# Patient Record
Sex: MALE | Race: OTHER | HISPANIC OR LATINO | Employment: FULL TIME | URBAN - METROPOLITAN AREA
[De-identification: names, ages, dates, MRNs, and addresses within clinical notes are randomized per-mention and may not be internally consistent; named-entity substitution may affect disease eponyms.]

---

## 2018-02-08 ENCOUNTER — APPOINTMENT (OUTPATIENT)
Dept: CT IMAGING | Facility: HOSPITAL | Age: 23
End: 2018-02-08

## 2018-02-08 ENCOUNTER — HOSPITAL ENCOUNTER (EMERGENCY)
Facility: HOSPITAL | Age: 23
Discharge: HOME | End: 2018-02-08
Attending: EMERGENCY MEDICINE | Admitting: EMERGENCY MEDICINE

## 2018-02-08 VITALS
SYSTOLIC BLOOD PRESSURE: 99 MMHG | TEMPERATURE: 98 F | HEIGHT: 69 IN | DIASTOLIC BLOOD PRESSURE: 45 MMHG | WEIGHT: 215 LBS | HEART RATE: 66 BPM | BODY MASS INDEX: 31.84 KG/M2 | OXYGEN SATURATION: 99 % | RESPIRATION RATE: 16 BRPM

## 2018-02-08 DIAGNOSIS — N20.0 KIDNEY STONE: Primary | ICD-10-CM

## 2018-02-08 LAB
ALBUMIN SERPL-MCNC: 4.4 G/DL (ref 3.5–5.2)
ALBUMIN/GLOB SERPL: 1.5 G/DL
ALP SERPL-CCNC: 96 U/L (ref 39–117)
ALT SERPL W P-5'-P-CCNC: 28 U/L (ref 1–41)
ANION GAP SERPL CALCULATED.3IONS-SCNC: 14.1 MMOL/L
AST SERPL-CCNC: 17 U/L (ref 1–40)
BACTERIA UR QL AUTO: ABNORMAL /HPF
BASOPHILS # BLD AUTO: 0.03 10*3/MM3 (ref 0–0.2)
BASOPHILS NFR BLD AUTO: 0.3 % (ref 0–1.5)
BILIRUB SERPL-MCNC: 0.4 MG/DL (ref 0.1–1.2)
BILIRUB UR QL STRIP: NEGATIVE
BUN BLD-MCNC: 12 MG/DL (ref 6–20)
BUN/CREAT SERPL: 13.2 (ref 7–25)
CALCIUM SPEC-SCNC: 9.5 MG/DL (ref 8.6–10.5)
CHLORIDE SERPL-SCNC: 105 MMOL/L (ref 98–107)
CLARITY UR: CLEAR
CO2 SERPL-SCNC: 22.9 MMOL/L (ref 22–29)
COLOR UR: YELLOW
CREAT BLD-MCNC: 0.91 MG/DL (ref 0.76–1.27)
DEPRECATED RDW RBC AUTO: 41.9 FL (ref 37–54)
EOSINOPHIL # BLD AUTO: 0.09 10*3/MM3 (ref 0–0.7)
EOSINOPHIL NFR BLD AUTO: 0.8 % (ref 0.3–6.2)
ERYTHROCYTE [DISTWIDTH] IN BLOOD BY AUTOMATED COUNT: 13.7 % (ref 11.5–14.5)
GFR SERPL CREATININE-BSD FRML MDRD: 126 ML/MIN/1.73
GLOBULIN UR ELPH-MCNC: 2.9 GM/DL
GLUCOSE BLD-MCNC: 121 MG/DL (ref 65–99)
GLUCOSE UR STRIP-MCNC: NEGATIVE MG/DL
HCT VFR BLD AUTO: 43.9 % (ref 40.4–52.2)
HGB BLD-MCNC: 16 G/DL (ref 13.7–17.6)
HGB UR QL STRIP.AUTO: ABNORMAL
HYALINE CASTS UR QL AUTO: ABNORMAL /LPF
IMM GRANULOCYTES # BLD: 0.02 10*3/MM3 (ref 0–0.03)
IMM GRANULOCYTES NFR BLD: 0.2 % (ref 0–0.5)
KETONES UR QL STRIP: NEGATIVE
LEUKOCYTE ESTERASE UR QL STRIP.AUTO: NEGATIVE
LIPASE SERPL-CCNC: 26 U/L (ref 13–60)
LYMPHOCYTES # BLD AUTO: 1.16 10*3/MM3 (ref 0.9–4.8)
LYMPHOCYTES NFR BLD AUTO: 10 % (ref 19.6–45.3)
MCH RBC QN AUTO: 31 PG (ref 27–32.7)
MCHC RBC AUTO-ENTMCNC: 36.4 G/DL (ref 32.6–36.4)
MCV RBC AUTO: 85.1 FL (ref 79.8–96.2)
MONOCYTES # BLD AUTO: 0.41 10*3/MM3 (ref 0.2–1.2)
MONOCYTES NFR BLD AUTO: 3.5 % (ref 5–12)
NEUTROPHILS # BLD AUTO: 9.87 10*3/MM3 (ref 1.9–8.1)
NEUTROPHILS NFR BLD AUTO: 85.2 % (ref 42.7–76)
NITRITE UR QL STRIP: NEGATIVE
NRBC BLD MANUAL-RTO: 0 /100 WBC (ref 0–0)
PH UR STRIP.AUTO: 6.5 [PH] (ref 5–8)
PLATELET # BLD AUTO: 195 10*3/MM3 (ref 140–500)
PMV BLD AUTO: 12.1 FL (ref 6–12)
POTASSIUM BLD-SCNC: 4.2 MMOL/L (ref 3.5–5.2)
PROT SERPL-MCNC: 7.3 G/DL (ref 6–8.5)
PROT UR QL STRIP: NEGATIVE
RBC # BLD AUTO: 5.16 10*6/MM3 (ref 4.6–6)
RBC # UR: ABNORMAL /HPF
REF LAB TEST METHOD: ABNORMAL
SODIUM BLD-SCNC: 142 MMOL/L (ref 136–145)
SP GR UR STRIP: 1.01 (ref 1–1.03)
SQUAMOUS #/AREA URNS HPF: ABNORMAL /HPF
UROBILINOGEN UR QL STRIP: ABNORMAL
WBC NRBC COR # BLD: 11.58 10*3/MM3 (ref 4.5–10.7)
WBC UR QL AUTO: ABNORMAL /HPF

## 2018-02-08 PROCEDURE — 83690 ASSAY OF LIPASE: CPT | Performed by: EMERGENCY MEDICINE

## 2018-02-08 PROCEDURE — 96374 THER/PROPH/DIAG INJ IV PUSH: CPT

## 2018-02-08 PROCEDURE — 80053 COMPREHEN METABOLIC PANEL: CPT | Performed by: EMERGENCY MEDICINE

## 2018-02-08 PROCEDURE — 25010000002 KETOROLAC TROMETHAMINE PER 15 MG: Performed by: EMERGENCY MEDICINE

## 2018-02-08 PROCEDURE — 99284 EMERGENCY DEPT VISIT MOD MDM: CPT

## 2018-02-08 PROCEDURE — 81001 URINALYSIS AUTO W/SCOPE: CPT | Performed by: EMERGENCY MEDICINE

## 2018-02-08 PROCEDURE — 25010000002 ONDANSETRON PER 1 MG: Performed by: EMERGENCY MEDICINE

## 2018-02-08 PROCEDURE — 96375 TX/PRO/DX INJ NEW DRUG ADDON: CPT

## 2018-02-08 PROCEDURE — 85025 COMPLETE CBC W/AUTO DIFF WBC: CPT | Performed by: EMERGENCY MEDICINE

## 2018-02-08 PROCEDURE — 74176 CT ABD & PELVIS W/O CONTRAST: CPT

## 2018-02-08 PROCEDURE — 96361 HYDRATE IV INFUSION ADD-ON: CPT

## 2018-02-08 RX ORDER — KETOROLAC TROMETHAMINE 30 MG/ML
30 INJECTION, SOLUTION INTRAMUSCULAR; INTRAVENOUS ONCE
Status: COMPLETED | OUTPATIENT
Start: 2018-02-08 | End: 2018-02-08

## 2018-02-08 RX ORDER — ONDANSETRON 2 MG/ML
4 INJECTION INTRAMUSCULAR; INTRAVENOUS ONCE
Status: COMPLETED | OUTPATIENT
Start: 2018-02-08 | End: 2018-02-08

## 2018-02-08 RX ADMIN — KETOROLAC TROMETHAMINE 30 MG: 30 INJECTION, SOLUTION INTRAMUSCULAR at 10:28

## 2018-02-08 RX ADMIN — HYDROMORPHONE HYDROCHLORIDE 1 MG: 10 INJECTION INTRAMUSCULAR; INTRAVENOUS; SUBCUTANEOUS at 10:33

## 2018-02-08 RX ADMIN — SODIUM CHLORIDE 1000 ML: 9 INJECTION, SOLUTION INTRAVENOUS at 10:18

## 2018-02-08 RX ADMIN — ONDANSETRON 4 MG: 2 INJECTION INTRAMUSCULAR; INTRAVENOUS at 10:25

## 2018-02-08 NOTE — DISCHARGE INSTRUCTIONS
Kidney Stones  Kidney stones (urolithiasis) are solid, rock-like deposits that form inside of the organs that make urine (kidneys). A kidney stone may form in a kidney and move into the bladder, where it can cause intense pain and block the flow of urine. Kidney stones are created when high levels of certain minerals are found in the urine. They are usually passed through urination, but in some cases, medical treatment may be needed to remove them.  What are the causes?  Kidney stones may be caused by:  · A condition in which certain glands produce too much parathyroid hormone (primary hyperparathyroidism), which causes too much calcium buildup in the blood.  · Buildup of uric acid crystals in the bladder (hyperuricosuria). Uric acid is a chemical that the body produces when you eat certain foods. It usually exits the body in the urine.  · Narrowing (stricture) of one or both of the tubes that drain urine from the kidneys to the bladder (ureters).  · A kidney blockage that is present at birth (congenital obstruction).  · Past surgery on the kidney or the ureters, such as gastric bypass surgery.  What increases the risk?  The following factors make you more likely to develop kidney stones:  · Having had a kidney stone in the past.  · Having a family history of kidney stones.  · Not drinking enough water.  · Eating a diet that is high in protein, salt (sodium), or sugar.  · Being overweight or obese.  What are the signs or symptoms?  Symptoms of a kidney stone may include:  · Nausea.  · Vomiting.  · Blood in the urine (hematuria).  · Pain in the side of the abdomen, right below the ribs (flank pain). Pain usually spreads (radiates) to the groin.  · Needing to urinate frequently or urgently.  How is this diagnosed?  This condition may be diagnosed based on:  · Your medical history.  · A physical exam.  · Blood tests.  · Urine tests.  · CT scan.  · Abdominal X-ray.  · A procedure to examine the inside of the bladder  (cystoscopy).  How is this treated?  Treatment for kidney stones depends on the size, location, and makeup of the stones. Treatment may involve:  · Analyzing your urine before and after you pass the stone through urination.  · Being monitored at the hospital until you pass the stone through urination.  · Increasing your fluid intake and decreasing the amount of calcium and protein in your diet.  · A procedure to break up kidney stones in the bladder using:  ¨ A focused beam of light (laser therapy).  ¨ Shock waves (extracorporeal shock wave lithotripsy).  · Surgery to remove kidney stones. This may be needed if you have severe pain or have stones that block your urinary tract.  Follow these instructions at home:  Eating and drinking  · Drink enough fluid to keep your urine clear or pale yellow. This will help you to pass the kidney stone.  · If directed, change your diet. This may include:  ¨ Limiting how much sodium you eat.  ¨ Eating more fruits and vegetables.  ¨ Limiting how much meat, poultry, fish, and eggs you eat.  · Follow instructions from your health care provider about eating or drinking restrictions.  General instructions  · Collect urine samples as told by your health care provider. You may need to collect a urine sample:  ¨ 24 hours after you pass the stone.  ¨ 8-12 weeks after passing the kidney stone, and every 6-12 months after that.  · Strain your urine every time you urinate, for as long as directed. Use the strainer that your health care provider recommends.  · Do not throw out the kidney stone after passing it. Keep the stone so it can be tested by your health care provider. Testing the makeup of your kidney stone may help prevent you from getting kidney stones in the future.  · Take over-the-counter and prescription medicines only as told by your health care provider.  · Keep all follow-up visits as told by your health care provider. This is important. You may need follow-up X-rays or  ultrasounds to make sure that your stone has passed.  How is this prevented?  To prevent another kidney stone:  · Drink enough fluid to keep your urine clear or pale yellow. This is the best way to prevent kidney stones.  · Eat a healthy diet and follow recommendations from your health care provider about foods to avoid. You may be instructed to eat a low-protein diet. Recommendations vary depending on the type of kidney stone that you have.  · Maintain a healthy weight.  Contact a health care provider if:  · You have pain that gets worse or does not get better with medicine.  Get help right away if:  · You have a fever or chills.  · You develop severe pain.  · You develop new abdominal pain.  · You faint.  · You are unable to urinate.  This information is not intended to replace advice given to you by your health care provider. Make sure you discuss any questions you have with your health care provider.  Document Released: 12/18/2006 Document Revised: 07/07/2017 Document Reviewed: 06/02/2017  [employer] Interactive Patient Education © 2017 [employer]

## 2018-02-08 NOTE — ED PROVIDER NOTES
EMERGENCY DEPARTMENT ENCOUNTER    CHIEF COMPLAINT  Chief Complaint: Flank pain  History given by: Pt  History limited by: Nothing  Room Number: 37/37  PMD: No Known Provider      HPI:  Pt is a 22 y.o. male who presents complaining of sudden onset R flank pain onset this morning as he was driving to work this morning. Pt reports his pain radiates into his R abd and groin. Pt also c/o N/V. Wife reports that the pt had generalized body aches last week, and that their family was ill with the flu at around that time. Pt denies a prior hx of kidney stones.    Duration:  This AM  Onset: sudden  Timing: constant  Location: R flank  Radiation: into R abd and groin  Quality: pain  Intensity/Severity: severe  Progression: unchanged  Associated Symptoms: N/V, abd pain  Aggravating Factors: none  Alleviating Factors: none  Previous Episodes: Pt denies a prior hx of kidney stones  Treatment before arrival: None stated PTA    PAST MEDICAL HISTORY  Active Ambulatory Problems     Diagnosis Date Noted   • No Active Ambulatory Problems     Resolved Ambulatory Problems     Diagnosis Date Noted   • No Resolved Ambulatory Problems     No Additional Past Medical History       PAST SURGICAL HISTORY  History reviewed. No pertinent surgical history.    FAMILY HISTORY  History reviewed. No pertinent family history.    SOCIAL HISTORY  Social History     Social History   • Marital status:      Spouse name: N/A   • Number of children: N/A   • Years of education: N/A     Occupational History   • Not on file.     Social History Main Topics   • Smoking status: Never Smoker   • Smokeless tobacco: Not on file   • Alcohol use 0.6 oz/week     1 Cans of beer per week   • Drug use: No   • Sexual activity: Defer     Other Topics Concern   • Not on file     Social History Narrative   • No narrative on file       ALLERGIES  Review of patient's allergies indicates no known allergies.    REVIEW OF SYSTEMS  Review of Systems   Constitutional: Negative  for activity change, appetite change and fever.   HENT: Negative for congestion and sore throat.    Eyes: Negative.    Respiratory: Negative for cough and shortness of breath.    Cardiovascular: Negative for chest pain and leg swelling.   Gastrointestinal: Positive for abdominal pain (R sided), nausea and vomiting. Negative for diarrhea.   Endocrine: Negative.    Genitourinary: Positive for flank pain (R). Negative for decreased urine volume and dysuria.   Musculoskeletal: Negative for neck pain.   Skin: Negative for rash and wound.   Allergic/Immunologic: Negative.    Neurological: Negative for weakness, numbness and headaches.   Hematological: Negative.    Psychiatric/Behavioral: Negative.    All other systems reviewed and are negative.      PHYSICAL EXAM  ED Triage Vitals   Temp Pulse Resp BP SpO2   -- -- -- -- --             Temp src Heart Rate Source Patient Position BP Location FiO2 (%)   -- -- -- -- --              Physical Exam   Constitutional: He is oriented to person, place, and time. He appears distressed (Pt appears to be in pain).   HENT:   Head: Normocephalic and atraumatic.   Eyes: EOM are normal. Pupils are equal, round, and reactive to light.   Neck: Normal range of motion. Neck supple.   Cardiovascular: Normal rate, regular rhythm and normal heart sounds.    Pulmonary/Chest: Effort normal and breath sounds normal. No respiratory distress.   Abdominal: Soft. Bowel sounds are hypoactive. There is tenderness in the right lower quadrant. There is CVA tenderness (R). There is no rebound and no guarding.   Musculoskeletal: Normal range of motion. He exhibits no edema.   Neurological: He is alert and oriented to person, place, and time. He has normal sensation and normal strength.   Skin: Skin is warm and dry.   Psychiatric: Mood and affect normal.   Nursing note and vitals reviewed.      LAB RESULTS  Lab Results (last 24 hours)     Procedure Component Value Units Date/Time    CBC & Differential  [[id number]] Collected:  02/08/18 1016    Specimen:  Blood Updated:  02/08/18 1051    Narrative:       The following orders were created for panel order CBC & Differential.  Procedure                               Abnormality         Status                     ---------                               -----------         ------                     CBC Auto Differential[[id number]]        Abnormal            Final result                 Please view results for these tests on the individual orders.    Comprehensive Metabolic Panel [[id number]]  (Abnormal) Collected:  02/08/18 1016    Specimen:  Blood Updated:  02/08/18 1050     Glucose 121 (H) mg/dL      BUN 12 mg/dL      Creatinine 0.91 mg/dL      Sodium 142 mmol/L      Potassium 4.2 mmol/L      Chloride 105 mmol/L      CO2 22.9 mmol/L      Calcium 9.5 mg/dL      Total Protein 7.3 g/dL      Albumin 4.40 g/dL      ALT (SGPT) 28 U/L      AST (SGOT) 17 U/L       Specimen hemolyzed.  Results may be affected.        Alkaline Phosphatase 96 U/L      Total Bilirubin 0.4 mg/dL      eGFR  African Amer 126 mL/min/1.73      Globulin 2.9 gm/dL      A/G Ratio 1.5 g/dL      BUN/Creatinine Ratio 13.2     Anion Gap 14.1 mmol/L     Lipase [[id number]]  (Normal) Collected:  02/08/18 1016    Specimen:  Blood Updated:  02/08/18 1048     Lipase 26 U/L     CBC Auto Differential [[id number]]  (Abnormal) Collected:  02/08/18 1016    Specimen:  Blood Updated:  02/08/18 1051     WBC 11.58 (H) 10*3/mm3      RBC 5.16 10*6/mm3      Hemoglobin 16.0 g/dL      Hematocrit 43.9 %      MCV 85.1 fL      MCH 31.0 pg      MCHC 36.4 g/dL      RDW 13.7 %      RDW-SD 41.9 fl      MPV 12.1 (H) fL      Platelets 195 10*3/mm3      Neutrophil % 85.2 (H) %      Lymphocyte % 10.0 (L) %      Monocyte % 3.5 (L) %      Eosinophil % 0.8 %      Basophil % 0.3 %      Immature Grans % 0.2 %      Neutrophils, Absolute 9.87 (H) 10*3/mm3      Lymphocytes, Absolute 1.16 10*3/mm3      Monocytes, Absolute 0.41 10*3/mm3       Eosinophils, Absolute 0.09 10*3/mm3      Basophils, Absolute 0.03 10*3/mm3      Immature Grans, Absolute 0.02 10*3/mm3      nRBC 0.0 /100 WBC     Urinalysis With / Culture If Indicated - Urine, Clean Catch [[id number]]  (Abnormal) Collected:  02/08/18 1137    Specimen:  Urine from Urine, Clean Catch Updated:  02/08/18 1201     Color, UA Yellow     Appearance, UA Clear     pH, UA 6.5     Specific Gravity, UA 1.009     Glucose, UA Negative     Ketones, UA Negative     Bilirubin, UA Negative     Blood, UA Large (3+) (A)     Protein, UA Negative     Leuk Esterase, UA Negative     Nitrite, UA Negative     Urobilinogen, UA 0.2 E.U./dL    Urinalysis, Microscopic Only - Urine, Clean Catch [[id number]]  (Abnormal) Collected:  02/08/18 1137    Specimen:  Urine from Urine, Clean Catch Updated:  02/08/18 1205     RBC, UA 3-5 (A) /HPF      WBC, UA 0-2 /HPF      Bacteria, UA None Seen /HPF      Squamous Epithelial Cells, UA 0-2 /HPF      Hyaline Casts, UA None Seen /LPF      Methodology Automated Microscopy          I ordered the above labs and reviewed the results    RADIOLOGY  CT Abdomen Pelvis Without Contrast   Preliminary Result   1. There is mild urothelial thickening involving the right ureter   without obstructing calculus. This may suggest an ascending infection   and correlation with urine exam is recommended.   2. The visualized lower lung fields demonstrate mix of both calcified   and noncalcified nodules. With the presence of calcified visualized   subcarinal and hilar lymphadenopathy this may represent sequela of   granulomatous disease. Follow-up recommended.       These findings were discussed with Dr. Canseco by telephone.       Radiation dose reduction techniques were utilized, including automated   exposure control and exposure modulation based on body size.                   I ordered the above noted radiological studies. Interpreted by radiologist. Discussed with radiologist (Dr. Nixon). Reviewed by me in  PACS.       PROCEDURES  Procedures      PROGRESS AND CONSULTS  ED Course     0944 - IVF, dilaudid, toradol, and zofran ordered for pain. CT abd/pelvis and lab work ordered for further evaluation.     1138 - Rechecked pt. Pt is resting comfortably and states he is now pain free. Informed pt of his CT abd/pelvis which shows a R ureteral thickening which could be from a recently passed stone or infection. Informed him of the plan to acquire urinalysis.    1206 - Rechecked pt. Pt is resting comfortably. Informed pt of his unremarkable urinalysis, and that I suspect he passed a kidney stone. D/w pt the plan to discharge home with a follow up with urology. Instructed pt to drink a large amount of fluids. Pt understands and agrees with plan. All questions answered.       MEDICAL DECISION MAKING  Results were reviewed/discussed with the patient and they were also made aware of online access. Pt also made aware that some labs, such as cultures, will not be resulted during ER visit and follow up with PMD is necessary.     MDM  Number of Diagnoses or Management Options  Kidney stone - passed:      Amount and/or Complexity of Data Reviewed  Clinical lab tests: ordered and reviewed (WBC - 11.58)  Tests in the radiology section of CPT®: ordered and reviewed (CT abd/pelvis - Mild R urothelial thickening of R ureter)  Discussion of test results with the performing providers: yes (Dr. Nixon)           DIAGNOSIS  Final diagnoses:   Kidney stone - passed       DISPOSITION  DISCHARGE    Patient discharged in stable condition.    Reviewed implications of results, diagnosis, meds, responsibility to follow up, warning signs and symptoms of possible worsening, potential complications and reasons to return to ER.    Patient/Family voiced understanding of above instructions.    Discussed plan for discharge, as there is no emergent indication for admission.  Pt/family is agreeable and understands need for follow up and repeat testing.  Pt  is aware that discharge does not mean that nothing is wrong but it indicates no emergency is present that requires admission and they must continue care with follow-up as given below or physician of their choice.     FOLLOW-UP  Enrike Moncada MD  [address] [facility] [id number] [phone]    In 1 week  If symptoms worsen         Medication List      Notice     No changes were made to your prescriptions during this visit.            Latest Documented Vital Signs:  As of 12:46 PM  BP- 99/45 HR- 66 Temp- 98 °F (36.7 °C) (Oral) O2 sat- 99%    --  Documentation assistance provided by charles Huggins for Dr. Canseco.  Information recorded by the scribe was done at my direction and has been verified and validated by me.            Brody Huggins  02/08/18 1248       Nas Canseco MD  02/08/18 [id number]

## 2019-02-18 ENCOUNTER — APPOINTMENT (OUTPATIENT)
Dept: GENERAL RADIOLOGY | Facility: HOSPITAL | Age: 24
End: 2019-02-18

## 2019-02-18 ENCOUNTER — HOSPITAL ENCOUNTER (EMERGENCY)
Facility: HOSPITAL | Age: 24
Discharge: HOME | End: 2019-02-18
Attending: EMERGENCY MEDICINE | Admitting: EMERGENCY MEDICINE

## 2019-02-18 VITALS
BODY MASS INDEX: 31.1 KG/M2 | HEIGHT: 69 IN | HEART RATE: 90 BPM | DIASTOLIC BLOOD PRESSURE: 72 MMHG | OXYGEN SATURATION: 100 % | RESPIRATION RATE: 16 BRPM | WEIGHT: 210 LBS | SYSTOLIC BLOOD PRESSURE: 142 MMHG | TEMPERATURE: 98.3 F

## 2019-02-18 DIAGNOSIS — S93.401A SPRAIN OF RIGHT ANKLE, UNSPECIFIED LIGAMENT, INITIAL ENCOUNTER: Primary | ICD-10-CM

## 2019-02-18 PROCEDURE — 73610 X-RAY EXAM OF ANKLE: CPT

## 2019-02-18 PROCEDURE — 99284 EMERGENCY DEPT VISIT MOD MDM: CPT

## 2019-02-18 PROCEDURE — 73590 X-RAY EXAM OF LOWER LEG: CPT

## 2019-02-18 RX ORDER — IBUPROFEN 800 MG/1
800 TABLET ORAL ONCE
Status: COMPLETED | OUTPATIENT
Start: 2019-02-18 | End: 2019-02-18

## 2019-02-18 RX ORDER — IBUPROFEN 600 MG/1
600 TABLET ORAL EVERY 6 HOURS PRN
Qty: 24 TABLET | Refills: 0 | Status: SHIPPED | OUTPATIENT
Start: 2019-02-18

## 2019-02-18 RX ADMIN — IBUPROFEN 800 MG: 800 TABLET, FILM COATED ORAL at 19:53

## 2019-02-19 NOTE — DISCHARGE INSTRUCTIONS
You are advised to follow closely with primary doctor or orthopedist of your choice in 5-7 days for recheck, discussion regarding possibility of physical therapy, final results of imaging testing, and further testing/treatment as needed.    Crutches/air cast as needed for discomfort  Elevate ice right ankle at least 3 times daily    Please return to the emergency department immediately with chest pain different than usual for you, shortness of air, abdominal pain, persistent vomiting/fever, blood in emesis or stool, lightheadedness/fainting, problems with speech, one sided weakness/numbness, new incontinence, problems with vision,  or for worsening of symptoms or other concerns.

## 2019-02-19 NOTE — ED PROVIDER NOTES
EMERGENCY DEPARTMENT ENCOUNTER    CHIEF COMPLAINT  Chief Complaint: ankle pain  History given by: patient  History limited by: none  Room Number: 03/03  PMD: Provider, No Known      HPI:  Pt is a 23 y.o. male who presents complaining of R ankle pain s/p to a mechanical fall that occurred about a week ago. Pt states that he was walking down the stairs when he missed the last step and fell, twisting his R ankle. Pt states that within the past few days the pain has begun to radiate up the lower RLE. He also c/o some mild L chest wall tenderness that has been present since the fall worse with palpation/movement and not associated with shortness of air. He denies any head injury, LOC, SOA, abdominal pain numbness, and tingling. There are no other complaints at this time.    Duration:  About a week  Onset: sudden  Timing: constant  Location: R ankle  Radiation: about MCC up the lower LLE  Quality: ankle pain  Intensity/Severity: moderate  Progression: worsened  Associated Symptoms: mild L chest wall pain  Aggravating Factors: movement of ankle  Alleviating Factors: rest  Previous Episodes: none  Treatment before arrival: none    PAST MEDICAL HISTORY  Active Ambulatory Problems     Diagnosis Date Noted   • No Active Ambulatory Problems     Resolved Ambulatory Problems     Diagnosis Date Noted   • No Resolved Ambulatory Problems     No Additional Past Medical History       PAST SURGICAL HISTORY  History reviewed. No pertinent surgical history.    FAMILY HISTORY  History reviewed. No pertinent family history.    SOCIAL HISTORY  Social History     Socioeconomic History   • Marital status:      Spouse name: Not on file   • Number of children: Not on file   • Years of education: Not on file   • Highest education level: Not on file   Social Needs   • Financial resource strain: Not on file   • Food insecurity - worry: Not on file   • Food insecurity - inability: Not on file   • Transportation needs - medical: Not on  file   • Transportation needs - non-medical: Not on file   Occupational History   • Not on file   Tobacco Use   • Smoking status: Never Smoker   Substance and Sexual Activity   • Alcohol use: Yes     Alcohol/week: 0.6 oz     Types: 1 Cans of beer per week   • Drug use: No   • Sexual activity: Defer   Other Topics Concern   • Not on file   Social History Narrative   • Not on file       ALLERGIES  Patient has no known allergies.    REVIEW OF SYSTEMS  Review of Systems   Constitutional: Negative for chills and fever.   HENT: Negative for sore throat and trouble swallowing.    Eyes: Negative for visual disturbance.   Respiratory: Negative for cough and shortness of breath.    Cardiovascular: Negative for chest pain and leg swelling.   Gastrointestinal: Negative for abdominal pain, diarrhea and vomiting.   Endocrine: Negative.    Genitourinary: Negative for decreased urine volume and frequency.   Musculoskeletal: Negative for neck pain.        (+) R ankle pain  (+) R chest wall pain   Skin: Negative for rash.   Allergic/Immunologic: Negative.    Neurological: Negative for weakness and numbness.   Hematological: Negative.    Psychiatric/Behavioral: Negative.    All other systems reviewed and are negative.      PHYSICAL EXAM  ED Triage Vitals   Temp Heart Rate Resp BP SpO2   02/18/19 1841 02/18/19 1841 02/18/19 1841 02/18/19 1858 02/18/19 1841   98.4 °F (36.9 °C) 97 16 131/69 100 %      Temp src Heart Rate Source Patient Position BP Location FiO2 (%)   02/18/19 1841 02/18/19 1841 -- -- --   Tympanic Monitor          Physical Exam   Constitutional: He is oriented to person, place, and time. He appears distressed.   HENT:   Head: Normocephalic and atraumatic.   Eyes: EOM are normal.   Neck: Normal range of motion.   Cardiovascular: Normal rate, regular rhythm and normal heart sounds.   No murmur heard.  Pulses:       Posterior tibial pulses are 2+ on the right side, and 2+ on the left side.   Pulmonary/Chest: Effort normal  and breath sounds normal. No respiratory distress. He has no wheezes. He exhibits tenderness (Minor left lower chest without crepitus).   Abdominal: Soft. Bowel sounds are normal. There is no tenderness. There is no rebound and no guarding.   Musculoskeletal: Normal range of motion. He exhibits no edema.        Right ankle: He exhibits no swelling, no ecchymosis, no deformity and normal pulse. Tenderness. Lateral malleolus, AITFL and proximal fibula (TTP) tenderness found. Achilles tendon normal.   Neurological: He is alert and oriented to person, place, and time.   Skin: Skin is warm and dry.   Psychiatric: Affect normal.   Nursing note and vitals reviewed.      RADIOLOGY  Xr Tibia Fibula 2 View Right    Result Date: 2/18/2019  Narrative: TWO-VIEW RIGHT TIBIA AND FIBULA AND THREE-VIEW RIGHT ANKLE  HISTORY: Fell. Pain.  FINDINGS: Views of the right tibia and fibula and ankle are negative for fracture.      Xr Ankle 3+ View Right    Result Date: 2/18/2019  Narrative: TWO-VIEW RIGHT TIBIA AND FIBULA AND THREE-VIEW RIGHT ANKLE  HISTORY: Fell. Pain.  FINDINGS: Views of the right tibia and fibula and ankle are negative for fracture.      I ordered the above noted radiological studies. Interpreted by radiologist. Reviewed by me in PACS.       PROCEDURES  Procedures      PROGRESS AND CONSULTS     1930- Ordered ibuprofen for pt pain, XR R ankle, and XR R tib fib.    2021- Rechecked pt. Pt is resting comfortably. Notified pt of his negative imaging results. Discussed the plan to discharge the pt home with prescriptions for ibuprofen. I instructed the pt to f/u with a PCP for further PT evaluation. Advised pt to use his splint and crutches as needed for assistance ambulating. RTED instructions given. Pt understands and agrees with the plan, all questions answered.    MEDICAL DECISION MAKING  Results were reviewed/discussed with the patient and they were also made aware of online access. Pt also made aware that some labs, such  as cultures, will not be resulted during ER visit and follow up with PMD is necessary.     MDM  Number of Diagnoses or Management Options  Sprain of right ankle, unspecified ligament, initial encounter:      Amount and/or Complexity of Data Reviewed  Tests in the radiology section of CPT®: reviewed and ordered (XR R tib fib and XR R ankle are both negative.)    Patient Progress  Patient progress: stable         DIAGNOSIS  Final diagnoses:   Sprain of right ankle, unspecified ligament, initial encounter       DISPOSITION  DISCHARGE    Patient discharged in stable condition.    Reviewed implications of results, diagnosis, meds, responsibility to follow up, warning signs and symptoms of possible worsening, potential complications and reasons to return to ER.    Patient/Family voiced understanding of above instructions.    Discussed plan for discharge, as there is no emergent indication for admission. Patient referred to primary care provider for BP management due to today's BP. Pt/family is agreeable and understands need for follow up and repeat testing.  Pt is aware that discharge does not mean that nothing is wrong but it indicates no emergency is present that requires admission and they must continue care with follow-up as given below or physician of their choice.     FOLLOW-UP  [facility] PHYSICAN REFERRAL SERVICE  [facility] [address]  [phone]  Schedule an appointment as soon as possible for a visit in 1 week  EVEN IF WELL    PATIENT LIAISON [place] [id number]  [phone]  Schedule an appointment as soon as possible for a visit in 1 week           Medication List      New Prescriptions    ibuprofen 600 MG tablet  Commonly known as:  ADVIL,MOTRIN  Take 1 tablet by mouth Every 6 (Six) Hours As Needed for Mild Pain  or   Moderate Pain  (take with food).              Latest Documented Vital Signs:  As of 8:22 PM  BP- 139/81 HR- 81 Temp- 98.4 °F (36.9 °C) (Tympanic) O2  sat- 100%    --  Documentation assistance provided by charles Hameed for Dr. Ulloa.  Information recorded by the scribe was done at my direction and has been verified and validated by me.     Juan Francisco Hameed  02/18/19 2022       Sue Ulloa MD  02/21/19 1024

## 2019-02-19 NOTE — ED NOTES
Patient c/o slip and fall down basement steps five days ago, landed on right ankle. Patient c/o pain, swelling and bruising. Patient applying cold compresses and  taking Epsom salt bath, denies relief of pain or swelling.  Pedal pulses palpable, brisk cap refill noted. Skin p/w/d, NAD noted. Cold compress applied     Kirsten Carroll RN  02/18/19 7142

## 2019-05-29 ENCOUNTER — HOSPITAL ENCOUNTER (EMERGENCY)
Facility: HOSPITAL | Age: 24
Discharge: HOME | End: 2019-05-29
Attending: EMERGENCY MEDICINE | Admitting: EMERGENCY MEDICINE

## 2019-05-29 VITALS
OXYGEN SATURATION: 99 % | TEMPERATURE: 98.7 F | DIASTOLIC BLOOD PRESSURE: 89 MMHG | HEART RATE: 97 BPM | HEIGHT: 69 IN | SYSTOLIC BLOOD PRESSURE: 136 MMHG | WEIGHT: 220 LBS | BODY MASS INDEX: 32.58 KG/M2 | RESPIRATION RATE: 18 BRPM

## 2019-05-29 DIAGNOSIS — L02.91 ABSCESS: Primary | ICD-10-CM

## 2019-05-29 PROCEDURE — 99284 EMERGENCY DEPT VISIT MOD MDM: CPT

## 2019-05-29 RX ORDER — HYDROCODONE BITARTRATE AND ACETAMINOPHEN 7.5; 325 MG/1; MG/1
1 TABLET ORAL ONCE
Status: COMPLETED | OUTPATIENT
Start: 2019-05-29 | End: 2019-05-29

## 2019-05-29 RX ORDER — SULFAMETHOXAZOLE AND TRIMETHOPRIM 800; 160 MG/1; MG/1
1 TABLET ORAL 2 TIMES DAILY
Qty: 14 TABLET | Refills: 0 | Status: SHIPPED | OUTPATIENT
Start: 2019-05-29 | End: 2019-06-05

## 2019-05-29 RX ORDER — LIDOCAINE HYDROCHLORIDE 10 MG/ML
10 INJECTION, SOLUTION EPIDURAL; INFILTRATION; INTRACAUDAL; PERINEURAL ONCE
Status: COMPLETED | OUTPATIENT
Start: 2019-05-29 | End: 2019-05-29

## 2019-05-29 RX ADMIN — HYDROCODONE BITARTRATE AND ACETAMINOPHEN 1 TABLET: 7.5; 325 TABLET ORAL at 20:03

## 2019-05-29 RX ADMIN — LIDOCAINE HYDROCHLORIDE 10 ML: 10 INJECTION, SOLUTION EPIDURAL; INFILTRATION; INTRACAUDAL; PERINEURAL at 20:03

## 2022-05-09 ENCOUNTER — HOSPITAL ENCOUNTER (EMERGENCY)
Facility: HOSPITAL | Age: 27
Discharge: HOME | End: 2022-05-09
Attending: EMERGENCY MEDICINE | Admitting: EMERGENCY MEDICINE

## 2022-05-09 VITALS
HEART RATE: 96 BPM | SYSTOLIC BLOOD PRESSURE: 100 MMHG | BODY MASS INDEX: 36 KG/M2 | RESPIRATION RATE: 18 BRPM | TEMPERATURE: 98.8 F | OXYGEN SATURATION: 100 % | DIASTOLIC BLOOD PRESSURE: 89 MMHG | HEIGHT: 66 IN | WEIGHT: 223.99 LBS

## 2022-05-09 DIAGNOSIS — L02.214 ABSCESS OF RIGHT GROIN: Primary | ICD-10-CM

## 2022-05-09 PROCEDURE — 99282 EMERGENCY DEPT VISIT SF MDM: CPT

## 2022-05-09 RX ORDER — CEPHALEXIN 500 MG/1
500 CAPSULE ORAL 2 TIMES DAILY
Qty: 20 CAPSULE | Refills: 0 | Status: SHIPPED | OUTPATIENT
Start: 2022-05-09

## 2022-05-09 RX ORDER — LIDOCAINE HYDROCHLORIDE 10 MG/ML
10 INJECTION, SOLUTION EPIDURAL; INFILTRATION; INTRACAUDAL; PERINEURAL ONCE
Status: DISCONTINUED | OUTPATIENT
Start: 2022-05-09 | End: 2022-05-09 | Stop reason: HOSPADM

## 2022-05-09 RX ORDER — CEPHALEXIN 500 MG/1
500 CAPSULE ORAL 2 TIMES DAILY
Qty: 20 CAPSULE | Refills: 0 | Status: SHIPPED | OUTPATIENT
Start: 2022-05-09 | End: 2022-05-09 | Stop reason: SDUPTHER

## 2022-05-09 NOTE — DISCHARGE INSTRUCTIONS
Change packing every other day.  Clean site with warm soapy water daily.  Take antibiotic as prescribed.  Return to ER if you worsen or develop a fever 100.4 or higher.

## 2022-05-09 NOTE — ED PROVIDER NOTES
Oralia Shaikh is a 27-year-old male that presents emergency department today for complaints of an abscess to his right groin.  He states it has been there for the last 4 days reports a history of abscesses.          Review of Systems   Skin: Positive for wound.   All other systems reviewed and are negative.      History reviewed. No pertinent past medical history.    No Known Allergies    History reviewed. No pertinent surgical history.    History reviewed. No pertinent family history.    Social History     Socioeconomic History   • Marital status:    Tobacco Use   • Smoking status: Current Every Day Smoker     Packs/day: 0.50     Types: Cigarettes   • Smokeless tobacco: Never Used   Vaping Use   • Vaping Use: Never used   Substance and Sexual Activity   • Alcohol use: Yes     Alcohol/week: 1.0 standard drink     Types: 1 Cans of beer per week   • Drug use: No   • Sexual activity: Defer           Objective   Physical Exam  Vitals and nursing note reviewed.   Constitutional:       General: He is not in acute distress.     Appearance: Normal appearance. He is not ill-appearing, toxic-appearing or diaphoretic.   Cardiovascular:      Rate and Rhythm: Normal rate.      Pulses: Normal pulses.   Pulmonary:      Effort: Pulmonary effort is normal. No respiratory distress.   Abdominal:      General: Abdomen is flat.      Palpations: Abdomen is soft.      Tenderness: There is no abdominal tenderness.   Genitourinary:     Penis: Normal.       Testes: Normal.   Musculoskeletal:         General: Normal range of motion.      Cervical back: Normal range of motion and neck supple.   Skin:     General: Skin is warm and dry.      Comments: Abscess to right groin that is fluctuant 2 cm x 1 cm   Neurological:      Mental Status: He is alert and oriented to person, place, and time. Mental status is at baseline.   Psychiatric:         Mood and Affect: Mood normal.         Behavior: Behavior normal.         Thought Content:  Thought content normal.         Judgment: Judgment normal.         Incision & Drainage    Date/Time: 5/9/2022 5:56 PM  Performed by: Cassie Dsouza APRN  Authorized by: Noam Albarado DO     Consent:     Consent obtained:  Verbal    Consent given by:  Patient    Risks, benefits, and alternatives were discussed: yes      Risks discussed:  Bleeding, damage to other organs, infection, incomplete drainage and pain    Alternatives discussed:  No treatment, delayed treatment, alternative treatment, observation and referral  Universal protocol:     Patient identity confirmed:  Verbally with patient  Location:     Type:  Abscess    Size:  2 cm x 1 cm    Location: right groin.  Pre-procedure details:     Skin preparation:  Povidone-iodine  Sedation:     Sedation type:  None  Anesthesia:     Anesthesia method:  Local infiltration    Local anesthetic:  Lidocaine 1% w/o epi  Procedure type:     Complexity:  Simple  Procedure details:     Ultrasound guidance: no      Needle aspiration: no      Incision types:  Stab incision    Incision depth:  Dermal    Drainage:  Bloody    Drainage amount:  Scant    Wound treatment:  Wound left open    Packing materials:  1/4 in gauze  Post-procedure details:     Procedure completion:  Tolerated               ED Course                                                 MDM  Number of Diagnoses or Management Options  Diagnosis management comments: Seen and assessed patient as noted.  Vital stable, no acute distress, afebrile.  See procedure note.  Educated patient on wound care and worrisome symptoms to return for and he verbalized understanding.  Educated wife on packing changes.  Prescribing antibiotic.    Risk of Complications, Morbidity, and/or Mortality  Presenting problems: moderate  Diagnostic procedures: moderate  Management options: moderate    Patient Progress  Patient progress: stable      Final diagnoses:   Abscess of right groin       ED Disposition  ED Disposition     ED  Disposition   Discharge    Condition   Stable    Comment   --             [facility] EMERGENCY ROOM  [address] [facility] [address] [phone]  Go to   If symptoms worsen         Medication List      New Prescriptions    cephalexin 500 MG capsule  Commonly known as: KEFLEX  Take 1 capsule by mouth 2 (Two) Times a Day.           Where to Get Your Medications      These medications were sent to [id number] - [place], [place] - [address] AT [facility] - [phone] [facility] [phone]   [address] [facility] [address]    Hours: 24-hours Phone: [phone]   · cephalexin 500 MG capsule          Cassie Dsouza, APRN  05/09/22 5438

## 2023-05-03 ENCOUNTER — OFFICE VISIT (OUTPATIENT)
Dept: INTERNAL MEDICINE | Facility: CLINIC | Age: 28
End: 2023-05-03
Payer: COMMERCIAL

## 2023-05-03 VITALS
SYSTOLIC BLOOD PRESSURE: 104 MMHG | HEART RATE: 73 BPM | DIASTOLIC BLOOD PRESSURE: 73 MMHG | OXYGEN SATURATION: 98 % | WEIGHT: 223.4 LBS | HEIGHT: 68 IN | TEMPERATURE: 97.4 F | BODY MASS INDEX: 33.86 KG/M2

## 2023-05-03 DIAGNOSIS — Z13.220 LIPID SCREENING: ICD-10-CM

## 2023-05-03 DIAGNOSIS — Z76.89 ESTABLISHING CARE WITH NEW DOCTOR, ENCOUNTER FOR: Primary | ICD-10-CM

## 2023-05-03 DIAGNOSIS — Z13.228 ENCOUNTER FOR SCREENING FOR METABOLIC DISORDER: ICD-10-CM

## 2023-05-03 DIAGNOSIS — Z13.29 THYROID DISORDER SCREEN: ICD-10-CM

## 2023-05-03 DIAGNOSIS — Z13.0 SCREENING FOR DEFICIENCY ANEMIA: ICD-10-CM

## 2023-05-03 DIAGNOSIS — K62.5 RECTAL BLEEDING: ICD-10-CM

## 2023-05-03 DIAGNOSIS — F10.10 ALCOHOL ABUSE: ICD-10-CM

## 2023-05-03 DIAGNOSIS — Z91.09 ENVIRONMENTAL ALLERGIES: ICD-10-CM

## 2023-05-03 LAB
ALBUMIN SERPL-MCNC: 4.6 G/DL (ref 3.5–5.2)
ALBUMIN/GLOB SERPL: 1.6 G/DL
ALP SERPL-CCNC: 134 U/L (ref 39–117)
ALT SERPL W P-5'-P-CCNC: 66 U/L (ref 1–41)
AMYLASE SERPL-CCNC: 63 U/L (ref 28–100)
ANION GAP SERPL CALCULATED.3IONS-SCNC: 14.5 MMOL/L (ref 5–15)
AST SERPL-CCNC: 28 U/L (ref 1–40)
BASOPHILS # BLD AUTO: 0.06 10*3/MM3 (ref 0–0.2)
BASOPHILS NFR BLD AUTO: 0.7 % (ref 0–1.5)
BILIRUB SERPL-MCNC: 0.7 MG/DL (ref 0–1.2)
BUN SERPL-MCNC: 12 MG/DL (ref 6–20)
BUN/CREAT SERPL: 13.6 (ref 7–25)
CALCIUM SPEC-SCNC: 9.8 MG/DL (ref 8.6–10.5)
CHLORIDE SERPL-SCNC: 106 MMOL/L (ref 98–107)
CHOLEST SERPL-MCNC: 141 MG/DL (ref 0–200)
CO2 SERPL-SCNC: 26.5 MMOL/L (ref 22–29)
CREAT SERPL-MCNC: 0.88 MG/DL (ref 0.76–1.27)
DEPRECATED RDW RBC AUTO: 42 FL (ref 37–54)
EGFRCR SERPLBLD CKD-EPI 2021: 120.1 ML/MIN/1.73
EOSINOPHIL # BLD AUTO: 0.32 10*3/MM3 (ref 0–0.4)
EOSINOPHIL NFR BLD AUTO: 3.7 % (ref 0.3–6.2)
ERYTHROCYTE [DISTWIDTH] IN BLOOD BY AUTOMATED COUNT: 13.6 % (ref 12.3–15.4)
GLOBULIN UR ELPH-MCNC: 2.8 GM/DL
GLUCOSE SERPL-MCNC: 90 MG/DL (ref 65–99)
HCT VFR BLD AUTO: 46.5 % (ref 37.5–51)
HDLC SERPL-MCNC: 37 MG/DL (ref 40–60)
HGB BLD-MCNC: 16.3 G/DL (ref 13–17.7)
IMM GRANULOCYTES # BLD AUTO: 0.02 10*3/MM3 (ref 0–0.05)
IMM GRANULOCYTES NFR BLD AUTO: 0.2 % (ref 0–0.5)
LDLC SERPL CALC-MCNC: 80 MG/DL (ref 0–100)
LDLC/HDLC SERPL: 2.09 {RATIO}
LIPASE SERPL-CCNC: 39 U/L (ref 13–60)
LYMPHOCYTES # BLD AUTO: 2.01 10*3/MM3 (ref 0.7–3.1)
LYMPHOCYTES NFR BLD AUTO: 23 % (ref 19.6–45.3)
MCH RBC QN AUTO: 30.4 PG (ref 26.6–33)
MCHC RBC AUTO-ENTMCNC: 35.1 G/DL (ref 31.5–35.7)
MCV RBC AUTO: 86.6 FL (ref 79–97)
MONOCYTES # BLD AUTO: 0.65 10*3/MM3 (ref 0.1–0.9)
MONOCYTES NFR BLD AUTO: 7.4 % (ref 5–12)
NEUTROPHILS NFR BLD AUTO: 5.67 10*3/MM3 (ref 1.7–7)
NEUTROPHILS NFR BLD AUTO: 65 % (ref 42.7–76)
NRBC BLD AUTO-RTO: 0 /100 WBC (ref 0–0.2)
PLATELET # BLD AUTO: 237 10*3/MM3 (ref 140–450)
PMV BLD AUTO: 12.3 FL (ref 6–12)
POTASSIUM SERPL-SCNC: 4.4 MMOL/L (ref 3.5–5.2)
PROT SERPL-MCNC: 7.4 G/DL (ref 6–8.5)
RBC # BLD AUTO: 5.37 10*6/MM3 (ref 4.14–5.8)
SODIUM SERPL-SCNC: 147 MMOL/L (ref 136–145)
TRIGL SERPL-MCNC: 133 MG/DL (ref 0–150)
TSH SERPL DL<=0.05 MIU/L-ACNC: 2.54 UIU/ML (ref 0.27–4.2)
VLDLC SERPL-MCNC: 24 MG/DL (ref 5–40)
WBC NRBC COR # BLD: 8.73 10*3/MM3 (ref 3.4–10.8)

## 2023-05-03 PROCEDURE — 84443 ASSAY THYROID STIM HORMONE: CPT | Performed by: NURSE PRACTITIONER

## 2023-05-03 PROCEDURE — 82150 ASSAY OF AMYLASE: CPT | Performed by: NURSE PRACTITIONER

## 2023-05-03 PROCEDURE — 83690 ASSAY OF LIPASE: CPT | Performed by: NURSE PRACTITIONER

## 2023-05-03 PROCEDURE — 80053 COMPREHEN METABOLIC PANEL: CPT | Performed by: NURSE PRACTITIONER

## 2023-05-03 PROCEDURE — 85025 COMPLETE CBC W/AUTO DIFF WBC: CPT | Performed by: NURSE PRACTITIONER

## 2023-05-03 PROCEDURE — 80061 LIPID PANEL: CPT | Performed by: NURSE PRACTITIONER

## 2023-05-03 RX ORDER — FLUTICASONE PROPIONATE 50 MCG
2 SPRAY, SUSPENSION (ML) NASAL DAILY
Qty: 11.1 ML | Refills: 6 | Status: SHIPPED | OUTPATIENT
Start: 2023-05-03

## 2023-05-03 RX ORDER — CETIRIZINE HYDROCHLORIDE 10 MG/1
10 TABLET ORAL DAILY
Qty: 90 TABLET | Refills: 1 | Status: SHIPPED | OUTPATIENT
Start: 2023-05-03

## 2023-05-03 NOTE — PROGRESS NOTES
"Chief Complaint  Establish Care (New pt here to establish care as well as discuss bright red blood in stools patients wife states has been ongoing for some years now. Pt would also like to discuss allergies)    Subjective        Neel Carrasquillo presents to [facility] for establishment of care and acute concerns.    Patient is needing primary care services, he has not had any primary care provider since becoming an adult.  He reports as a child he had some issues with his stomach, very vague on what these problems were, but ultimately resolved by age 10 and did not require any regular follow-up care.  He has not had any significant medical problems as an adult.  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  He does use alcohol very regularly, drinking 30+ alcoholic beverages over the weekend, starting Friday and ending on Sunday.  Does not drink regularly during the week.    Here for acute concerns regarding rectal bleeding, has been going on off and on for the last year.  Is reported as bright red blood.  Typically associated with a bowel movement.  Reports some straining with bowel movements from time to time, having hard bowel movements every now and then.  Does not necessarily eat healthy, no regular exercise.    Also concern for allergies, reports that he has itchy eyes, runny nose, congestion from time to time, occasional cough.  Has used Flonase in the past, but nothing else.    Objective   Vital Signs:   /73 (BP Location: Right arm, Patient Position: Sitting, Cuff Size: Large Adult)   Pulse 73   Temp 97.4 °F (36.3 °C) (Temporal)   Ht 173.2 cm (68.2\")   Wt 101 kg (223 lb 6.4 oz)   SpO2 98%   BMI 33.77 kg/m²     Physical Exam  Vitals and nursing note reviewed.   Constitutional:       Appearance: Normal appearance.   HENT:      Head: Normocephalic and atraumatic.      Right Ear: External ear normal.      Left Ear: External ear normal.      Nose: Nose normal.      " Mouth/Throat:      Mouth: Mucous membranes are moist.      Pharynx: Oropharynx is clear.   Eyes:      Conjunctiva/sclera: Conjunctivae normal.      Pupils: Pupils are equal, round, and reactive to light.   Cardiovascular:      Rate and Rhythm: Normal rate.   Pulmonary:      Effort: Pulmonary effort is normal.   Neurological:      Mental Status: He is alert.   Psychiatric:         Mood and Affect: Mood normal.         Thought Content: Thought content normal.        Result Review :  {The following data was reviewed by DINORA Villarreal on 05/03/23                Diagnoses and all orders for this visit:    1. Establishing care with new doctor, encounter for (Primary)    2. Rectal bleeding  -     Comprehensive Metabolic Panel  -     CBC & Differential  -     Amylase  -     Lipase    3. Alcohol abuse  -     Comprehensive Metabolic Panel  -     CBC & Differential  -     Amylase  -     Lipase    4. Environmental allergies  -     cetirizine (zyrTEC) 10 MG tablet; Take 1 tablet by mouth Daily.  Dispense: 90 tablet; Refill: 1  -     fluticasone (FLONASE) 50 MCG/ACT nasal spray; 2 sprays into the nostril(s) as directed by provider Daily.  Dispense: 11.1 mL; Refill: 6    5. Lipid screening  -     Lipid Panel    6. Thyroid disorder screen  -     TSH    7. Screening for deficiency anemia  -     CBC & Differential    8. Encounter for screening for metabolic disorder  -     Comprehensive Metabolic Panel    Will get blood work today to assess his alcohol abuse and concerns for rectal bleeding.  We discussed likelihood that it could just be internal hemorrhoids, there is no sign of external hemorrhoids based on his reports.  We will follow-up with recommendations based on lab work results.  We will use cetirizine and Flonase for his allergies.  Other screening labs as recommended.  Follow-up as needed.      Follow Up   No follow-ups on file.  Patient was given instructions and counseling regarding his condition or for health  maintenance advice. Please see specific information pulled into the AVS if appropriate.     Americo Romero, DINORA  5/3/2023  This note was electronically signed.

## 2023-05-04 ENCOUNTER — TELEPHONE (OUTPATIENT)
Dept: INTERNAL MEDICINE | Facility: CLINIC | Age: 28
End: 2023-05-04
Payer: COMMERCIAL

## 2023-05-04 NOTE — TELEPHONE ENCOUNTER
----- Message from DINORA Villarreal sent at 5/4/2023  7:24 AM EDT -----  Please contact patient with recent lab work.  Metabolic panel overall looks okay, his liver enzymes are a little elevated, this is likely due to his alcohol use.  We discussed this yesterday, encouraged him to cut back on the amount of alcohol intake that he has.  Cholesterol levels overall look good.  Blood counts are all normal.  Thyroid function, amylase, lipase all normal.  Please let me know if any questions.

## 2023-05-04 NOTE — TELEPHONE ENCOUNTER
I was talking to Patients wife and phone lost connection.I called back and left a message to return my call.

## 2023-09-21 ENCOUNTER — OFFICE VISIT (OUTPATIENT)
Dept: INTERNAL MEDICINE | Facility: CLINIC | Age: 28
End: 2023-09-21
Payer: COMMERCIAL

## 2023-09-21 VITALS
HEART RATE: 74 BPM | TEMPERATURE: 97.6 F | BODY MASS INDEX: 36.32 KG/M2 | WEIGHT: 231.4 LBS | SYSTOLIC BLOOD PRESSURE: 100 MMHG | OXYGEN SATURATION: 99 % | HEIGHT: 67 IN | RESPIRATION RATE: 14 BRPM | DIASTOLIC BLOOD PRESSURE: 58 MMHG

## 2023-09-21 DIAGNOSIS — M77.51 TENDINITIS OF BOTH ANKLES: ICD-10-CM

## 2023-09-21 DIAGNOSIS — M77.52 TENDINITIS OF BOTH ANKLES: ICD-10-CM

## 2023-09-21 PROCEDURE — 99213 OFFICE O/P EST LOW 20 MIN: CPT | Performed by: NURSE PRACTITIONER

## 2023-09-21 NOTE — PROGRESS NOTES
"Chief Complaint  Ankle Pain (TENDINITIS IN ANKLES- would like referral for foot doctor )    Subjective        Neel Carrasquillo presents to [facility] for bilateral ankle pain.  Patient reports over the last several months he has been having ankle pain in bilateral ankles, sometimes it is associated with swelling.  Patient does work construction, is a tile worker.  He uses his ankles significantly.  Patient was seen in urgent care on 7/17/2023, diagnosed with tendinitis of bilateral ankles, swelling was minimal.  Was started on prednisone for short course, also diclofenac, which both helped with his symptoms.  Patient was told he should see podiatrist, and is requesting a referral to podiatry today.  He is also run out of medication, which was helpful.    Objective   Vital Signs:   /58 (BP Location: Right arm, Patient Position: Sitting, Cuff Size: Large Adult)   Pulse 74   Temp 97.6 °F (36.4 °C) (Temporal)   Resp 14   Ht 170.2 cm (67.01\")   Wt 105 kg (231 lb 6.4 oz)   SpO2 99%   BMI 36.23 kg/m²     Physical Exam  Vitals and nursing note reviewed.   Pulmonary:      Effort: Pulmonary effort is normal.   Musculoskeletal:         General: Normal range of motion.   Neurological:      Mental Status: He is alert.   Psychiatric:         Mood and Affect: Mood normal.      Result Review :  {The following data was reviewed by DINORA Villarreal on 09/21/23                Diagnoses and all orders for this visit:    1. Tendinitis of both ankles  -     Ambulatory Referral to Podiatry  -     diclofenac (VOLTAREN) 50 MG EC tablet; Take 1 tablet by mouth 3 (Three) Times a Day.  Dispense: 30 tablet; Refill: 0    Discussed patient's condition, will refer to podiatry, Dr. Crabtree is requested due to distance.  Diclofenac refilled, discussed appropriate use, risk, benefits, side effects.  Can follow-up with me as needed.      Follow Up   No follow-ups on file.  Patient was given instructions and " counseling regarding his condition or for health maintenance advice. Please see specific information pulled into the AVS if appropriate.     Americo Romero, APRN  9/21/2023  This note was electronically signed.

## 2023-09-28 DIAGNOSIS — M77.52 TENDINITIS OF BOTH ANKLES: ICD-10-CM

## 2023-09-28 DIAGNOSIS — M77.51 TENDINITIS OF BOTH ANKLES: ICD-10-CM

## 2024-08-24 ENCOUNTER — HOSPITAL ENCOUNTER (EMERGENCY)
Facility: HOSPITAL | Age: 29
Discharge: HOME | End: 2024-08-25
Attending: EMERGENCY MEDICINE

## 2024-08-24 VITALS
TEMPERATURE: 98 F | SYSTOLIC BLOOD PRESSURE: 135 MMHG | WEIGHT: 230 LBS | OXYGEN SATURATION: 98 % | BODY MASS INDEX: 34.86 KG/M2 | RESPIRATION RATE: 18 BRPM | DIASTOLIC BLOOD PRESSURE: 90 MMHG | HEIGHT: 68 IN | HEART RATE: 95 BPM

## 2024-08-24 DIAGNOSIS — U07.1 COVID-19 VIRUS DETECTED: Primary | ICD-10-CM

## 2024-08-24 PROCEDURE — 87637 SARSCOV2&INF A&B&RSV AMP PRB: CPT

## 2024-08-24 PROCEDURE — 99283 EMERGENCY DEPT VISIT LOW MDM: CPT

## 2024-08-24 NOTE — Clinical Note
[facility] EMERGENCY ROOM  [address] [place] [address]  Phone: [phone]  Fax: [phone]    Neel Carrasquillo was seen and treated in our emergency department on 8/24/2024.  He may return to work on 08/28/2024.         Thank you for choosing [facility].    Lashon Liz APRN

## 2024-08-24 NOTE — Clinical Note
[facility] EMERGENCY ROOM  [address] [place] [address]  Phone: [phone]  Fax: [phone]    Neel Carrasquillo was seen and treated in our emergency department on 8/24/2024.  He may return to work on 08/28/2024.         Thank you for choosing [facility].    Lashon Liz APRN

## 2024-08-25 LAB
FLUAV SUBTYP SPEC NAA+PROBE: NOT DETECTED
FLUBV RNA ISLT QL NAA+PROBE: NOT DETECTED
RSV RNA NPH QL NAA+NON-PROBE: NOT DETECTED
S PYO AG THROAT QL: NEGATIVE
SARS-COV-2 RNA RESP QL NAA+PROBE: DETECTED

## 2024-08-25 PROCEDURE — 87081 CULTURE SCREEN ONLY: CPT | Performed by: NURSE PRACTITIONER

## 2024-08-25 PROCEDURE — 87880 STREP A ASSAY W/OPTIC: CPT | Performed by: NURSE PRACTITIONER

## 2024-08-25 RX ORDER — BROMPHENIRAMINE MALEATE, PSEUDOEPHEDRINE HYDROCHLORIDE, AND DEXTROMETHORPHAN HYDROBROMIDE 2; 30; 10 MG/5ML; MG/5ML; MG/5ML
10 SYRUP ORAL 4 TIMES DAILY PRN
Qty: 473 ML | Refills: 0 | Status: SHIPPED | OUTPATIENT
Start: 2024-08-25 | End: 2024-08-25

## 2024-08-25 RX ORDER — BROMPHENIRAMINE MALEATE, PSEUDOEPHEDRINE HYDROCHLORIDE, AND DEXTROMETHORPHAN HYDROBROMIDE 2; 30; 10 MG/5ML; MG/5ML; MG/5ML
10 SYRUP ORAL 4 TIMES DAILY PRN
Qty: 473 ML | Refills: 0 | Status: SHIPPED | OUTPATIENT
Start: 2024-08-25

## 2024-08-25 NOTE — DISCHARGE INSTRUCTIONS
You have tested positive for COVID-19.    Rest.  Drink plenty of fluids.  Alternate Tylenol and Motrin for fever or pain.    Cough syrup as prescribed.    Follow-up with PCP as needed.    Return for new or worsening symptoms

## 2024-08-25 NOTE — ED PROVIDER NOTES
Time: 11:23 PM EDT  Date of encounter:  8/24/2024  Independent Historian/Clinical History and Information was obtained by:   Patient    History is limited by: N/A    Chief Complaint   Patient presents with    Exposure To Known Illness     mold         History of Present Illness:  Patient is a 29 y.o. year old male who presents to the emergency department for evaluation of cough.  Patient has had cough, sore throat, body aches and feeling unwell for the past 3 to 4 days.  Patient was also cleaning out his basement and was exposed to mold.  (Provider in triage, Filemon Baires PA-C)    Patient Care Team  Primary Care Provider: Americo Romero APRN    Past Medical History:     No Known Allergies  History reviewed. No pertinent past medical history.  History reviewed. No pertinent surgical history.  Family History   Problem Relation Age of Onset    Diabetes Father     Diabetes Sister        Home Medications:  Prior to Admission medications    Medication Sig Start Date End Date Taking? Authorizing Provider   cetirizine (zyrTEC) 10 MG tablet Take 1 tablet by mouth Daily. 5/3/23   Americo Romero APRN   diclofenac (VOLTAREN) 50 MG EC tablet Take 1 tablet by mouth 3 (Three) Times a Day. 9/21/23   Americo Romero APRN   fluticasone (FLONASE) 50 MCG/ACT nasal spray 2 sprays into the nostril(s) as directed by provider Daily. 5/3/23   Americo Romero APRN        Social History:   Social History     Tobacco Use    Smoking status: Former     Current packs/day: 0.50     Types: Cigarettes    Smokeless tobacco: Current     Types: Chew   Vaping Use    Vaping status: Never Used   Substance Use Topics    Alcohol use: Yes     Alcohol/week: 1.0 standard drink of alcohol     Types: 1 Cans of beer per week     Comment: occ    Drug use: No         Review of Systems:  Review of Systems   Constitutional:  Negative for chills and fever.   HENT:  Positive for sore throat. Negative for congestion, ear pain, rhinorrhea, sinus pressure, sinus pain and  "sneezing.    Eyes:  Negative for pain.   Respiratory:  Positive for cough. Negative for chest tightness, shortness of breath and wheezing.    Cardiovascular:  Negative for chest pain.   Gastrointestinal:  Negative for abdominal pain, diarrhea, nausea and vomiting.   Genitourinary:  Negative for flank pain and hematuria.   Musculoskeletal:  Positive for myalgias. Negative for joint swelling.   Skin:  Negative for pallor.   Neurological:  Negative for seizures and headaches.   Hematological: Negative.    Psychiatric/Behavioral: Negative.     All other systems reviewed and are negative.       Physical Exam:  /90   Pulse 95   Temp 98 °F (36.7 °C)   Resp 18   Ht 172.7 cm (68\")   Wt 104 kg (230 lb)   SpO2 98%   BMI 34.97 kg/m²         Physical Exam  Vitals and nursing note reviewed.   Constitutional:       General: He is not in acute distress.     Appearance: Normal appearance. He is not toxic-appearing.   HENT:      Head: Normocephalic and atraumatic.      Right Ear: Tympanic membrane, ear canal and external ear normal.      Left Ear: Tympanic membrane, ear canal and external ear normal.      Nose: Nose normal.      Mouth/Throat:      Mouth: Mucous membranes are moist.   Eyes:      General: No scleral icterus.     Extraocular Movements: Extraocular movements intact.      Conjunctiva/sclera: Conjunctivae normal.      Pupils: Pupils are equal, round, and reactive to light.   Cardiovascular:      Rate and Rhythm: Normal rate and regular rhythm.      Heart sounds: Normal heart sounds.   Pulmonary:      Effort: Pulmonary effort is normal. No respiratory distress.      Breath sounds: Normal breath sounds.   Abdominal:      General: Bowel sounds are normal. There is no distension.      Palpations: Abdomen is soft.      Tenderness: There is no abdominal tenderness.   Musculoskeletal:         General: Normal range of motion.      Cervical back: Normal range of motion and neck supple.   Skin:     General: Skin is warm " and dry.      Coloration: Skin is not cyanotic.   Neurological:      Mental Status: He is alert and oriented to person, place, and time. Mental status is at baseline.   Psychiatric:         Attention and Perception: Attention and perception normal.         Mood and Affect: Mood normal.         Behavior: Behavior normal.              Medical Decision Making:      Comorbidities that affect care:    None    External Notes reviewed:    Previous Clinic Note: Patient last seen by PCP back in September 2023 for tendinitis      The following orders were placed and all results were independently analyzed by me:  Orders Placed This Encounter   Procedures    COVID-19, FLU A/B, RSV PCR 1 HR TAT - Swab, Nasopharynx    Rapid Strep A Screen - Swab, Throat    Beta Strep Culture, Throat - Swab, Throat       Medications Given in the Emergency Department:  Medications - No data to display     ED Course:    The patient was initially evaluated in the triage area where orders were placed. The patient was later dispositioned by DINORA Ennis.      The patient was advised to stay for completion of workup which includes but is not limited to communication of labs and radiological results, reassessment and plan. The patient was advised that leaving prior to disposition by a provider could result in critical findings that are not communicated to the patient.     ED Course as of 08/25/24 0706   Sat Aug 24, 2024   2323 PROVIDER IN TRIAGE  Patient was evaluated by me in triage, Filemon Baires PA-C.  Orders were placed and patient is currently awaiting final results and disposition.  [MD]      ED Course User Index  [MD] Filemon Baires PA-C       Labs:    Lab Results (last 24 hours)       Procedure Component Value Units Date/Time    COVID-19, FLU A/B, RSV PCR 1 HR TAT - Swab, Nasopharynx [[id number]]  (Abnormal) Collected: 08/24/24 2328    Specimen: Swab from Nasopharynx Updated: 08/25/24 0034     COVID19 Detected     Influenza A PCR Not  Detected     Influenza B PCR Not Detected     RSV, PCR Not Detected    Narrative:      Fact sheet for providers: https://www.fda.gov/media/573761/download    Fact sheet for patients: https://www.fda.gov/media/608227/download    Test performed by PCR.    Rapid Strep A Screen - Swab, Throat [[id number]]  (Normal) Collected: 08/25/24 0021    Specimen: Swab from Throat Updated: 08/25/24 0047     Strep A Ag Negative    Beta Strep Culture, Throat - Swab, Throat [[id number]] Collected: 08/25/24 0021    Specimen: Swab from Throat Updated: 08/25/24 0047             Imaging:    No Radiology Exams Resulted Within Past 24 Hours      Differential Diagnosis and Discussion:      Cough: Differential diagnosis includes but is not limited to pneumonia, acute bronchitis, upper respiratory infection, ACE inhibitor use, allergic reaction, epiglottitis, seasonal allergies, chemical irritants, exercise-induced asthma, viral syndrome.    All labs were reviewed and interpreted by me.    MDM  Number of Diagnoses or Management Options  COVID-19 virus detected  Diagnosis management comments: The patient presents to the ED with a cough. The patient is resting comfortably and feels better, is alert and in no distress.  On re-examination the patient does not appear toxic and has no meningeal signs (including a negative Kernig and Brudzinski sign), and there's no intractable vomiting, respiratory distress and no apparent pain. Based on the history, exam, diagnostic testing and reassessment, the patient has no signs of meningitis, significant pneumonia, pyelonephritis, sepsis or other acute serious bacterial infections, or other significant pathology to warrant further testing, continued ED treatment, admission or specialist evaluation. The patient's vital signs have been stable. The patient's condition is stable and is appropriate for discharge. The patient´s symptoms are consistent with a viral upper respiratory infection and antibiotics are not  indicated. The [] was counseled to return to the ED for re-evaluation for worsening cough, shortness of breath, uncontrollable headache, uncontrollable fever, altered mental status, or any symptoms which cause them concern. The patient will pursue further outpatient evaluation with the primary care physician or other designated or consultant physician as indicated in the discharge instructions.               Amount and/or Complexity of Data Reviewed  Clinical lab tests: reviewed and ordered    Risk of Complications, Morbidity, and/or Mortality  Presenting problems: low  Diagnostic procedures: low  Management options: low    Patient Progress  Patient progress: stable           Patient Care Considerations:    ANTIBIOTICS: I considered prescribing antibiotics as an outpatient however no bacterial focus of infection was found.      Consultants/Shared Management Plan:    None    Social Determinants of Health:    Patient is independent, reliable, and has access to care.       Disposition and Care Coordination:    Discharged: The patient is suitable and stable for discharge with no need for consideration of admission.    I have explained the patient´s condition, diagnoses and treatment plan based on the information available to me at this time. I have answered questions and addressed any concerns. The patient has a good  understanding of the patient´s diagnosis, condition, and treatment plan as can be expected at this point. The vital signs have been stable. The patient´s condition is stable and appropriate for discharge from the emergency department.      The patient will pursue further outpatient evaluation with the primary care physician or other designated or consulting physician as outlined in the discharge instructions. They are agreeable to this plan of care and follow-up instructions have been explained in detail. The patient has received these instructions in written format and has expressed an understanding of  the discharge instructions. The patient is aware that any significant change in condition or worsening of symptoms should prompt an immediate return to this or the closest emergency department or call to 911.  I have explained discharge medications and the need for follow up with the patient/caretakers. This was also printed in the discharge instructions. Patient was discharged with the following medications and follow up:      Medication List        New Prescriptions      brompheniramine-pseudoephedrine-DM 30-2-10 MG/5ML syrup  Take 10 mL by mouth 4 (Four) Times a Day As Needed for Congestion or Cough.               Where to Get Your Medications        These medications were sent to [facility]/pharmacy #[id number] - [place], [place] - [address] - [phone]  - [phone]  [address] [place] [address]      Hours: 24-hours Phone: [phone]   brompheniramine-pseudoephedrine-DM 30-2-10 MG/5ML syrup      Americo Romero APRN  [address] [address] [phone]      As needed       Final diagnoses:   COVID-19 virus detected        ED Disposition       ED Disposition   Discharge    Condition   Stable    Comment   --               This medical record created using voice recognition software.             Lashon Liz APRN  08/25/24 0143

## 2024-08-27 LAB — BACTERIA SPEC AEROBE CULT: NORMAL
